# Patient Record
Sex: FEMALE | Race: WHITE | Employment: OTHER | URBAN - METROPOLITAN AREA
[De-identification: names, ages, dates, MRNs, and addresses within clinical notes are randomized per-mention and may not be internally consistent; named-entity substitution may affect disease eponyms.]

---

## 2017-04-07 ENCOUNTER — HOSPITAL ENCOUNTER (OUTPATIENT)
Dept: MAMMOGRAPHY | Age: 42
Discharge: HOME | End: 2017-04-07
Attending: PHYSICIAN ASSISTANT
Payer: COMMERCIAL

## 2017-04-07 DIAGNOSIS — Z12.39 SCREENING FOR BREAST CANCER: ICD-10-CM

## 2017-04-07 PROCEDURE — 77067 SCR MAMMO BI INCL CAD: CPT

## 2017-12-17 ENCOUNTER — HOSPITAL ENCOUNTER (EMERGENCY)
Age: 42
Discharge: HOME | End: 2017-12-17
Attending: EMERGENCY MEDICINE
Payer: SELF-PAY

## 2017-12-17 VITALS
RESPIRATION RATE: 16 BRPM | DIASTOLIC BLOOD PRESSURE: 62 MMHG | BODY MASS INDEX: 24.33 KG/M2 | TEMPERATURE: 97.6 F | OXYGEN SATURATION: 100 % | WEIGHT: 155 LBS | SYSTOLIC BLOOD PRESSURE: 100 MMHG | HEART RATE: 64 BPM | HEIGHT: 67 IN

## 2017-12-17 DIAGNOSIS — M54.10 RADICULOPATHY AFFECTING UPPER EXTREMITY: Primary | ICD-10-CM

## 2017-12-17 PROCEDURE — 74011250636 HC RX REV CODE- 250/636: Performed by: EMERGENCY MEDICINE

## 2017-12-17 PROCEDURE — 93005 ELECTROCARDIOGRAM TRACING: CPT | Performed by: STUDENT IN AN ORGANIZED HEALTH CARE EDUCATION/TRAINING PROGRAM

## 2017-12-17 PROCEDURE — 74011250637 HC RX REV CODE- 250/637: Performed by: EMERGENCY MEDICINE

## 2017-12-17 PROCEDURE — 96372 THER/PROPH/DIAG INJ SC/IM: CPT | Performed by: EMERGENCY MEDICINE

## 2017-12-17 PROCEDURE — 99284 EMERGENCY DEPT VISIT MOD MDM: CPT | Performed by: EMERGENCY MEDICINE

## 2017-12-17 RX ORDER — TRAMADOL HYDROCHLORIDE 50 MG/1
50 TABLET ORAL
Qty: 15 TAB | Refills: 0 | Status: SHIPPED | OUTPATIENT
Start: 2017-12-17 | End: 2017-12-20 | Stop reason: SDUPTHER

## 2017-12-17 RX ORDER — DEXAMETHASONE SODIUM PHOSPHATE 100 MG/10ML
10 INJECTION INTRAMUSCULAR; INTRAVENOUS
Status: COMPLETED | OUTPATIENT
Start: 2017-12-17 | End: 2017-12-17

## 2017-12-17 RX ORDER — PREDNISONE 10 MG/1
TABLET ORAL
Qty: 48 TAB | Refills: 0 | Status: SHIPPED | OUTPATIENT
Start: 2017-12-17 | End: 2018-01-18 | Stop reason: ALTCHOICE

## 2017-12-17 RX ORDER — HYDROMORPHONE HYDROCHLORIDE 1 MG/ML
0.75 INJECTION, SOLUTION INTRAMUSCULAR; INTRAVENOUS; SUBCUTANEOUS
Status: COMPLETED | OUTPATIENT
Start: 2017-12-17 | End: 2017-12-17

## 2017-12-17 RX ORDER — METHOCARBAMOL 750 MG/1
750 TABLET, FILM COATED ORAL
Status: COMPLETED | OUTPATIENT
Start: 2017-12-17 | End: 2017-12-17

## 2017-12-17 RX ORDER — METHOCARBAMOL 750 MG/1
750 TABLET, FILM COATED ORAL 3 TIMES DAILY
Qty: 18 TAB | Refills: 0 | Status: SHIPPED | OUTPATIENT
Start: 2017-12-17 | End: 2018-01-18 | Stop reason: ALTCHOICE

## 2017-12-17 RX ADMIN — HYDROMORPHONE HYDROCHLORIDE 0.75 MG: 1 INJECTION, SOLUTION INTRAMUSCULAR; INTRAVENOUS; SUBCUTANEOUS at 13:21

## 2017-12-17 RX ADMIN — METHOCARBAMOL 750 MG: 750 TABLET ORAL at 13:20

## 2017-12-17 RX ADMIN — DEXAMETHASONE SODIUM PHOSPHATE 10 MG: 10 INJECTION INTRAMUSCULAR; INTRAVENOUS at 13:17

## 2017-12-17 NOTE — ED NOTES
Pt. Resting in bed, breathing and vital signs within normal limits, offered water and blanket. Pt. States she is feeling better after taking the medication.

## 2017-12-17 NOTE — DISCHARGE INSTRUCTIONS
Pinched Nerve in the Neck: Care Instructions  Your Care Instructions  A pinched nerve in the neck happens when a vertebra or disc in the upper part of your spine is damaged. This damage can happen because of an injury. Or it can just happen with age. The changes caused by the damage may put pressure on a nearby nerve root, pinching it. This causes symptoms such as sharp pain in your neck, shoulder, arm, hand, or back. You may also have tingling or numbness. Sometimes it makes your arm weaker. The symptoms are usually worse when you turn your head or strain your neck. For many people, the symptoms get better over time and finally go away. Early treatment usually includes medicines for pain and swelling. Sometimes physical therapy and special exercises may help. Follow-up care is a key part of your treatment and safety. Be sure to make and go to all appointments, and call your doctor if you are having problems. It's also a good idea to know your test results and keep a list of the medicines you take. How can you care for yourself at home? · Be safe with medicines. Read and follow all instructions on the label. ¨ If the doctor gave you a prescription medicine for pain, take it as prescribed. ¨ If you are not taking a prescription pain medicine, ask your doctor if you can take an over-the-counter medicine. · Try using a heating pad on a low or medium setting for 15 to 20 minutes every 2 or 3 hours. Try a warm shower in place of one session with the heating pad. You can also buy single-use heat wraps that last up to 8 hours. · You can also try an ice pack for 10 to 15 minutes every 2 to 3 hours. There isn't strong evidence that either heat or ice will help. But you can try them to see if they help you. · Don't spend too long in one position. Take short breaks to move around and change positions. · Wear a seat belt and shoulder harness when you are in a car.   · Sleep with a pillow under your head and neck that keeps your neck straight. · If you were given a neck brace (cervical collar) to limit neck motion, wear it as instructed for as many days as your doctor tells you to. Do not wear it longer than you were told to. Wearing a brace for too long can lead to neck stiffness and can weaken the neck muscles. · Follow your doctor's instructions for gentle neck-stretching exercises. · Do not smoke. Smoking can slow healing of your discs. If you need help quitting, talk to your doctor about stop-smoking programs and medicines. These can increase your chances of quitting for good. · Avoid strenuous work or exercise until your doctor says it is okay. When should you call for help? Call 911 anytime you think you may need emergency care. For example, call if:  ? · You are unable to move an arm or a leg at all. ?Call your doctor now or seek immediate medical care if:  ? · You have new or worse symptoms in your arms, legs, chest, belly, or buttocks. Symptoms may include:  ¨ Numbness or tingling. ¨ Weakness. ¨ Pain. ? · You lose bladder or bowel control. ? Watch closely for changes in your health, and be sure to contact your doctor if:  ? · You are not getting better as expected. Where can you learn more? Go to http://lincoln-evelyne.info/. Enter K130 in the search box to learn more about \"Pinched Nerve in the Neck: Care Instructions. \"  Current as of: March 21, 2017  Content Version: 11.4  © 7344-7326 [employer]. Care instructions adapted under license by [employer] (which disclaims liability or warranty for this information). If you have questions about a medical condition or this instruction, always ask your healthcare professional. [place] [address] any warranty or liability for your use of this information.

## 2017-12-17 NOTE — ED NOTES
I have reviewed discharge instructions with the patient. The patient verbalized understanding. Patient left ED via Discharge Method: ambulatory to Home with (insert name of family/friend, self, transport via ). Opportunity for questions and clarification provided. Patient given 3 scripts. To continue your aftercare when you leave the hospital, you may receive an automated call from our care team to check in on how you are doing. This is a free service and part of our promise to provide the best care and service to meet your aftercare needs.  If you have questions, or wish to unsubscribe from this service please call [phone]. Thank you for Choosing our [facility] Emergency Department.

## 2017-12-17 NOTE — ED TRIAGE NOTES
Pt in c/o upper back pain radiating into left shoulder starting Thursday. States seen at chiropractor Friday and had xrays done. Denies n/v/d/sob/injury. States pain was present when she woke up Thursday.

## 2017-12-17 NOTE — ED PROVIDER NOTES
HPI Comments: Patient is a 56-year-old female who is coming in with pain to the left shoulder and upper back he states it started Thursday when she woke up. It is been mainly along the inside border of her scapula but now has been radiating down her left arm. She's had no weakness or any other associated symptoms she denies any vomiting diarrhea or trouble breathing or trauma at all. She does state that she had x-rays done from her chiropractor and has brought these in with her she has tried ibuprofen without relief. Patient is a 40 y.o. female presenting with back pain. The history is provided by the patient. Back Pain    Pertinent negatives include no chest pain, no fever and no abdominal pain. Past Medical History:   Diagnosis Date    Anemia in pregnancy 7/15/2014    Anemia in pregnancy 7/15/2014    Gestational diabetes 2014    Postpartum depression     no meds currently       Past Surgical History:   Procedure Laterality Date     DELIVERY ONLY      HX  SECTION      HX  SECTION  16    HX OTHER SURGICAL      HX WISDOM TEETH EXTRACTION           Family History:   Problem Relation Age of Onset    Diabetes Father 48    Hypertension Father     SLE Maternal Aunt     Heart Disease Maternal Uncle     Stroke Maternal Grandmother [de-identified]    Heart Disease Maternal Grandfather     Diabetes Paternal Uncle 48    Breast Cancer Neg Hx        Social History     Social History    Marital status:      Spouse name: Mina Whitaker Number of children: 3    Years of education: 12     Occupational History    Not on file.      Social History Main Topics    Smoking status: Never Smoker    Smokeless tobacco: Never Used    Alcohol use No    Drug use: No    Sexual activity: Yes     Partners: Male     Birth control/ protection: Surgical      Comment: Vasectomy     Other Topics Concern    Not on file     Social History Narrative         ALLERGIES: Review of patient's allergies indicates no known allergies. Review of Systems   Constitutional: Negative for chills and fever. Respiratory: Negative for chest tightness, shortness of breath, wheezing and stridor. Cardiovascular: Negative for chest pain and palpitations. Gastrointestinal: Negative for abdominal pain, diarrhea, nausea and vomiting. Musculoskeletal: Positive for back pain. Negative for neck pain and neck stiffness. All other systems reviewed and are negative. Vitals:    12/17/17 1224   BP: 116/68   Pulse: 78   Resp: 17   Temp: 97.7 °F (36.5 °C)   SpO2: 100%   Weight: 70.3 kg (155 lb)   Height: 5' 7\" (1.702 m)            Physical Exam   Constitutional: She is oriented to person, place, and time. She appears well-developed and well-nourished. No distress. HENT:   Head: Normocephalic and atraumatic. Eyes: Conjunctivae are normal. No scleral icterus. Neck: Normal range of motion. Neck supple. Cardiovascular: Normal rate, regular rhythm and normal heart sounds. Pulmonary/Chest: Effort normal and breath sounds normal. No stridor. No respiratory distress. She has no wheezes. She has no rales. She exhibits no tenderness. Abdominal: Soft. She exhibits no distension. There is no tenderness. There is no rebound and no guarding. Musculoskeletal:   Pain along the medial border of the scapula diffusely with palpation. Neurological: She is alert and oriented to person, place, and time. No focal weakness 5 over 5 strength no arm drift L arm weakness sensation intact and symmetric in both upper extremities. Skin: Skin is warm and dry. No rash noted. She is not diaphoretic. No erythema. Psychiatric: She has a normal mood and affect. Her behavior is normal.   Nursing note and vitals reviewed.        MDM  Number of Diagnoses or Management Options  Radiculopathy affecting upper extremity:   Diagnosis management comments: Patient feeling better on reevaluation she states pain is down to about a 3 chest no focal weakness. But was advised that if symptoms persisted she may need further workup including MRI to elicit the etiology. We will try treating symptomatically first.    Dulce Maria Bacon MD; 12/17/2017 @2:32 PM Voice dictation software was used during the making of this note. This software is not perfect and grammatical and other typographical errors may be present.   This note has not been proofread for errors.  ===================================================================        Amount and/or Complexity of Data Reviewed  Independent visualization of images, tracings, or specimens: yes      ED Course       Procedures

## 2017-12-18 LAB
ATRIAL RATE: 70 BPM
CALCULATED P AXIS, ECG09: 84 DEGREES
CALCULATED R AXIS, ECG10: 91 DEGREES
CALCULATED T AXIS, ECG11: 86 DEGREES
DIAGNOSIS, 93000: NORMAL
P-R INTERVAL, ECG05: 164 MS
Q-T INTERVAL, ECG07: 370 MS
QRS DURATION, ECG06: 76 MS
QTC CALCULATION (BEZET), ECG08: 399 MS
VENTRICULAR RATE, ECG03: 70 BPM

## 2019-08-02 ENCOUNTER — HOSPITAL ENCOUNTER (OUTPATIENT)
Dept: MAMMOGRAPHY | Age: 44
Discharge: HOME | End: 2019-08-02
Attending: OBSTETRICS & GYNECOLOGY
Payer: COMMERCIAL

## 2019-08-02 DIAGNOSIS — Z12.39 SCREENING FOR BREAST CANCER: ICD-10-CM

## 2019-08-02 PROCEDURE — 77067 SCR MAMMO BI INCL CAD: CPT

## 2022-10-18 ENCOUNTER — OFFICE VISIT (OUTPATIENT)
Dept: OBGYN CLINIC | Age: 47
End: 2022-10-18

## 2022-10-18 VITALS
WEIGHT: 180 LBS | BODY MASS INDEX: 28.93 KG/M2 | SYSTOLIC BLOOD PRESSURE: 110 MMHG | HEIGHT: 66 IN | DIASTOLIC BLOOD PRESSURE: 84 MMHG

## 2022-10-18 DIAGNOSIS — Z01.419 WELL WOMAN EXAM: Primary | ICD-10-CM

## 2022-10-18 DIAGNOSIS — Z12.4 SCREENING FOR CERVICAL CANCER: ICD-10-CM

## 2022-10-18 DIAGNOSIS — Z11.51 SCREENING FOR HUMAN PAPILLOMAVIRUS (HPV): ICD-10-CM

## 2022-10-18 DIAGNOSIS — Z12.31 ENCOUNTER FOR SCREENING MAMMOGRAM FOR MALIGNANT NEOPLASM OF BREAST: ICD-10-CM

## 2022-10-18 DIAGNOSIS — N92.0 MENORRHAGIA WITH REGULAR CYCLE: ICD-10-CM

## 2022-10-18 PROBLEM — M54.12 CERVICAL RADICULOPATHY: Status: ACTIVE | Noted: 2022-02-11

## 2022-10-18 PROCEDURE — 99396 PREV VISIT EST AGE 40-64: CPT | Performed by: OBSTETRICS & GYNECOLOGY

## 2022-10-18 RX ORDER — TRANEXAMIC ACID 650 1/1
1300 TABLET ORAL EVERY 8 HOURS PRN
Qty: 30 TABLET | Refills: 12 | Status: SHIPPED | OUTPATIENT
Start: 2022-10-18

## 2022-10-18 NOTE — PROGRESS NOTES
10/18/2022    Keri Body  1975      PCP: Betty Gutierrez MD  Patient does not see them for regular preventative visits. HPI: 56y.o. year old No obstetric history on file. Here for annual gyn wellness exam.   Last seen 9-2020. BC = vas. Having very heavy periods the 1st 1-3 days of each period. Having to double up large tampon and pad. Also a lot of lead up spotting for a few days. This has been an issue the past 2yrs  No pain with menses. Patient's last menstrual period was 09/22/2022. Social History     Socioeconomic History    Marital status:      Spouse name: None    Number of children: None    Years of education: 16    Highest education level: None   Tobacco Use    Smoking status: Never    Smokeless tobacco: Never   Substance and Sexual Activity    Alcohol use: No    Drug use: No    Sexual activity: Yes     Partners: Male     Birth control/protection: Surgical     Comment: huband - Vasectomy     Last pap - neg cotest 9-2020  Lipids- declines  Colonoscopy- has not had  Mammogram- NL 8-2019      Allergies, medications, past medical and surgical history, social history, family history all reviewed.        Review of Systems      Constitutional:  Denies unexplained weight loss/gain or heat/ cold intolerance/loss of balance  ENT: Denies blurred vision, loss of hearing, hoarseness  Cardiovascular:  Denies chest pain, swelling in legs or feet, shortness of breath when lying flat  Respiratory:  Denies shortness of breath, cough greater than 2 weeks or coughing up blood  Gastro: Denies diarrhea greater than 2 weeks, rectal bleeding, bloody stools, heartburn, or constipation  :  Denies blood in urine, nocturia, dysuria or incontinence  Breast:  Denies nipple discharge, masses or pain  Skin:  Denies rash greater than 2 weeks, change in moles  Musculoskeletal/Neuro:  Denies joint pain, muscle weakness, seizures, headaches  Psych:  Denies new onset depression, anxiety, panic attacks  Heme:  Denies easy bruising, bleeding gums, frequent nosebleeds or swollen lymph nodes  GYN:  Denies bleeding or spotting between menses,  pain with sex, severe menstrual cramps, bleeding after sex    Patient referred to a PCP for any significant nongyn findings on ROS. /84   Ht 5' 6\" (1.676 m)   Wt 180 lb (81.6 kg)   LMP 09/22/2022   BMI 29.05 kg/m²     Body mass index is 29.05 kg/m². Wt Readings from Last 3 Encounters:   10/18/22 180 lb (81.6 kg)         Pt in no distress. Alert and oriented x3. Affect bright. Well developed, well nourished. HEENT: normocephalic, atraumatic. Sclerae nonicteric. Neck supple w/o thyromegaly. Trachea midline. Lungs:  Respiratory effort normal.   Breasts: no masses or axillary lymphadenopathy  Abdomen: soft and nontender, no masses, no organomegaly, no ventral hernia  Pelvic: normal external genitalia, urethral meatus, vagina and cervix. Bimanual exam w/o obvious masses or tenderness. Bladder nontender. Uterus upper limits normal size. Adnexae normal.  Perineum and anus normal. No hemorrhoids. Josh Lee was seen today for annual exam.    Diagnoses and all orders for this visit:    Well woman exam    Encounter for screening mammogram for malignant neoplasm of breast  -     LENNOX DIGITAL SCREEN W OR WO CAD BILATERAL; Future    Screening for cervical cancer  -     PAP IG, Aptima HPV and rfx 16/18,45 ([id number])    Screening for human papillomavirus (HPV)  -     PAP IG, Aptima HPV and rfx 16/18,45 ([id number])    Menorrhagia with regular cycle  -     TSH with Reflex; Future  -     CBC; Future  -     tranexamic acid (LYSTEDA) 650 MG TABS tablet; Take 2 tablets by mouth every 8 hours as needed (heavy menses)          Pt counseling: Will get pt up to date on mammogram. Pap sent today. Pt is self pay. We discussed colonoscopy- she might want to do cologuard instead. But she may have a GF who had colon ca. She is going to ck with her mother.   Rtn for Stephanie Honeycutt maged. In the meantime, try lysteda. No known fam hx dvt/pe. Her father may have had some type of clot, but was related to an injury.      Tone Herrmann MD, MD

## 2022-10-19 LAB
ERYTHROCYTE [DISTWIDTH] IN BLOOD BY AUTOMATED COUNT: 17.3 % (ref 11.9–14.6)
HCT VFR BLD AUTO: 38.6 % (ref 35.8–46.3)
HGB BLD-MCNC: 11.7 G/DL (ref 11.7–15.4)
MCH RBC QN AUTO: 24.5 PG (ref 26.1–32.9)
MCHC RBC AUTO-ENTMCNC: 30.3 G/DL (ref 31.4–35)
MCV RBC AUTO: 80.9 FL (ref 82–102)
NRBC # BLD: 0 K/UL (ref 0–0.2)
PLATELET # BLD AUTO: 287 K/UL (ref 150–450)
PMV BLD AUTO: 9.9 FL (ref 9.4–12.3)
RBC # BLD AUTO: 4.77 M/UL (ref 4.05–5.2)
TSH W FREE THYROID IF ABNORMAL: 2.21 UIU/ML (ref 0.36–3.74)
WBC # BLD AUTO: 7.1 K/UL (ref 4.3–11.1)

## 2022-10-28 LAB
CYTOLOGIST CVX/VAG CYTO: NORMAL
CYTOLOGY CVX/VAG DOC THIN PREP: NORMAL
HPV APTIMA: NEGATIVE
Lab: NORMAL
Lab: NORMAL
PATH REPORT.FINAL DX SPEC: NORMAL
STAT OF ADQ CVX/VAG CYTO-IMP: NORMAL

## 2023-01-02 NOTE — PROGRESS NOTES
SONOHYSTEROGRAM    1/3/2023    Yanelis Granado  1975      56 y.o. Patient's last menstrual period was 12/05/2022 (approximate). Birth Control: vas                    UCG: neg    History: very heavy periods the 1st 3d of each cycle. I gave her rx miguel ROBBINS in Oct.     See ultrasound report for ultrasound results. Uterine vol 109  Stripe 18mm with mass effect  1cm fibroid measured  Both ovaries NL    /70   Wt 182 lb (82.6 kg)   LMP 12/05/2022 (Approximate)   BMI 29.38 kg/m²       Patient was placed in the lithotomy position and speculum was inserted. Cervix visualized and cleansed with betadine. Single-tooth tenaculum was not applied to the anterior cervix. Catheter was inserted through the OS. Sounding depth was 8 CM. Speculum and/or tenaculum were removed. Sterile saline 8 cc was instilled and ultrasound revealed large lesion c/w polyp. Endometrial biopsy was not obtained. Patient tolerated procedure well. Encounter Diagnosis   Name Primary? Menorrhagia with regular cycle Yes       Orders Placed This Encounter   Procedures    AMB POC URINE PREGNANCY TEST, VISUAL COLOR COMPARISON         Follow-Up Plan:   D/w pt that removal of the polyp should help her menstrual flow. Also needs to be removed d/t occ finding of endometrial pathology within a polyp. Staff mess sent to [facility] to schedule.        Sean Peraza MD, MD

## 2023-01-03 ENCOUNTER — PROCEDURE VISIT (OUTPATIENT)
Dept: OBGYN CLINIC | Age: 48
End: 2023-01-03

## 2023-01-03 VITALS — BODY MASS INDEX: 29.38 KG/M2 | WEIGHT: 182 LBS | DIASTOLIC BLOOD PRESSURE: 70 MMHG | SYSTOLIC BLOOD PRESSURE: 102 MMHG

## 2023-01-03 DIAGNOSIS — N92.0 MENORRHAGIA WITH REGULAR CYCLE: Primary | ICD-10-CM

## 2023-01-03 LAB
HCG, PREGNANCY, URINE, POC: NEGATIVE
VALID INTERNAL CONTROL, POC: YES

## 2023-01-03 PROCEDURE — 76831 ECHO EXAM UTERUS: CPT | Performed by: OBSTETRICS & GYNECOLOGY

## 2023-01-03 PROCEDURE — 58340 CATHETER FOR HYSTEROGRAPHY: CPT | Performed by: OBSTETRICS & GYNECOLOGY

## 2023-01-03 PROCEDURE — 81025 URINE PREGNANCY TEST: CPT | Performed by: OBSTETRICS & GYNECOLOGY

## 2023-01-04 ENCOUNTER — PREP FOR PROCEDURE (OUTPATIENT)
Dept: OBGYN CLINIC | Age: 48
End: 2023-01-04

## 2023-01-04 PROBLEM — N92.0 HEAVY MENSTRUAL PERIOD: Status: ACTIVE | Noted: 2023-01-04

## 2023-02-16 PROBLEM — R93.5 ABNORMAL ULTRASOUND OF ENDOMETRIUM: Status: ACTIVE | Noted: 2023-02-16

## 2023-02-16 RX ORDER — FERROUS SULFATE 325(65) MG
325 TABLET ORAL
COMMUNITY

## 2023-02-16 NOTE — PERIOP NOTE
Patient verified name and . Order for consent not found in EHR. Type 1B surgery, PAT phone assessment complete. Orders not received. Labs per surgeon: no orders received. Labs per anesthesia protocol: Hgb- Pt instructed to come for lab work (Monday- Friday 8:00 AM- 3:30 PM) prior to surgery day. Pt voiced an understanding. Patient answered medical/surgical history questions at their best of ability. All prior to admission medications documented in [facility]. Patient instructed to take the following medications the day of surgery according to anesthesia guidelines with a small sip of water: none. On the day before surgery please take Acetaminophen 1000mg in the morning and then again before bed. You may substitute for Tylenol 650 mg. Hold all vitamins/supplements 7 days prior to surgery and NSAIDS 5 days prior to surgery. Patient instructed on the following:    > Arrive at A Entrance, time of arrival to be called the day before by 1700  > NPO after midnight, unless otherwise indicated, including gum, mints, and ice chips  > Responsible adult must drive patient to the hospital, stay during surgery, and patient will need supervision 24 hours after anesthesia  > Use antibacterial soap in shower the night before surgery and on the morning of surgery  > All piercings must be removed prior to arrival.    > Leave all valuables (money and jewelry) at home but bring insurance card and ID on DOS.   > You may be required to pay a deductible or co-pay on the day of your procedure. You can pre-pay by calling [phone] if your surgery is at the [facility] or [phone] if your surgery is at the [facility]. > Do not wear make-up, nail polish, lotions, cologne, perfumes, powders, or oil on skin. Artificial nails are not permitted.

## 2023-02-16 NOTE — PROGRESS NOTES
2023      Dony Lorenzana  : 1975  56 y.o.       [id number]     Patient's last menstrual period was 2023. Patient is scheduled for d&c, hysteroscopy, myosure for very heavy menses. Please see pertinent office notes. Ultrasound shows large polyp on sonohys. Last pap neg cotest   Contraception: vasectomy    ROS: pertinent items noted in HPI    No Known Allergies  Current Outpatient Medications   Medication Sig Dispense Refill    Multiple Vitamins-Minerals (HAIR SKIN AND NAILS FORMULA) TABS Take by mouth daily      ferrous sulfate (IRON 325) 325 (65 Fe) MG tablet Take 325 mg by mouth daily (with breakfast)       No current facility-administered medications for this visit. Past Medical History:   Diagnosis Date    Anemia in pregnancy 7/15/2014    GERD (gastroesophageal reflux disease)     HX of    Gestational diabetes 2014    Iron deficiency anemia     Postpartum depression     no meds currently     Past Surgical History:   Procedure Laterality Date     SECTION  16     SECTION      WISDOM TOOTH EXTRACTION       Social History     Tobacco Use    Smoking status: Never    Smokeless tobacco: Never   Vaping Use    Vaping Use: Never used   Substance Use Topics    Alcohol use: No    Drug use: No         Exam:    /70   Ht 5' 6\" (1.676 m)   Wt 181 lb (82.1 kg)   LMP 2023   BMI 29.21 kg/m²   Body mass index is 29.21 kg/m². Patient alert and oriented x 3. In no distress. Heart RRR, no murmur or gallop  Lungs clear to auscultation  Pelvic: NL bimanual and speculum exams    Proceed with the above  for   Encounter Diagnoses   Name Primary? Menorrhagia with regular cycle Yes    Abnormal ultrasound of endometrium        Reviewed the risks of surgery including anesthesia, bleeding, transfusion, infection, injury to bowel/bladder/uterus. This procedure most likely will help her heavy periods, but that cannot be guaranteed.   Plan otc motrin and tylenol postop  Discussed activity restrictions  No hx postop n/v

## 2023-02-16 NOTE — H&P (VIEW-ONLY)
2023      Lissette Sterling  : 1975  56 y.o.       [id number]     Patient's last menstrual period was 2023. Patient is scheduled for d&c, hysteroscopy, myosure for very heavy menses. Please see pertinent office notes. Ultrasound shows large polyp on sonohys. Last pap neg cotest   Contraception: vasectomy    ROS: pertinent items noted in HPI    No Known Allergies  Current Outpatient Medications   Medication Sig Dispense Refill    Multiple Vitamins-Minerals (HAIR SKIN AND NAILS FORMULA) TABS Take by mouth daily      ferrous sulfate (IRON 325) 325 (65 Fe) MG tablet Take 325 mg by mouth daily (with breakfast)       No current facility-administered medications for this visit. Past Medical History:   Diagnosis Date    Anemia in pregnancy 7/15/2014    GERD (gastroesophageal reflux disease)     HX of    Gestational diabetes 2014    Iron deficiency anemia     Postpartum depression     no meds currently     Past Surgical History:   Procedure Laterality Date     SECTION  16     SECTION      WISDOM TOOTH EXTRACTION       Social History     Tobacco Use    Smoking status: Never    Smokeless tobacco: Never   Vaping Use    Vaping Use: Never used   Substance Use Topics    Alcohol use: No    Drug use: No         Exam:    /70   Ht 5' 6\" (1.676 m)   Wt 181 lb (82.1 kg)   LMP 2023   BMI 29.21 kg/m²   Body mass index is 29.21 kg/m². Patient alert and oriented x 3. In no distress. Heart RRR, no murmur or gallop  Lungs clear to auscultation  Pelvic: NL bimanual and speculum exams    Proceed with the above  for   Encounter Diagnoses   Name Primary? Menorrhagia with regular cycle Yes    Abnormal ultrasound of endometrium        Reviewed the risks of surgery including anesthesia, bleeding, transfusion, infection, injury to bowel/bladder/uterus. This procedure most likely will help her heavy periods, but that cannot be guaranteed.   Plan otc motrin and tylenol postop  Discussed activity restrictions  No hx postop n/v

## 2023-02-17 ENCOUNTER — HOSPITAL ENCOUNTER (OUTPATIENT)
Dept: LAB | Age: 48
Discharge: HOME | End: 2023-02-17

## 2023-02-17 ENCOUNTER — OFFICE VISIT (OUTPATIENT)
Dept: OBGYN CLINIC | Age: 48
End: 2023-02-17

## 2023-02-17 VITALS
BODY MASS INDEX: 29.09 KG/M2 | SYSTOLIC BLOOD PRESSURE: 102 MMHG | DIASTOLIC BLOOD PRESSURE: 70 MMHG | WEIGHT: 181 LBS | HEIGHT: 66 IN

## 2023-02-17 DIAGNOSIS — Z01.818 PRE-OP TESTING: ICD-10-CM

## 2023-02-17 DIAGNOSIS — N92.0 MENORRHAGIA WITH REGULAR CYCLE: Primary | ICD-10-CM

## 2023-02-17 DIAGNOSIS — R93.5 ABNORMAL ULTRASOUND OF ENDOMETRIUM: ICD-10-CM

## 2023-02-17 LAB — HGB BLD-MCNC: 12.8 G/DL (ref 11.7–15.4)

## 2023-02-17 PROCEDURE — 36415 COLL VENOUS BLD VENIPUNCTURE: CPT

## 2023-02-17 PROCEDURE — 85018 HEMOGLOBIN: CPT

## 2023-02-19 RX ORDER — SODIUM CHLORIDE 9 MG/ML
INJECTION, SOLUTION INTRAVENOUS PRN
Status: CANCELLED | OUTPATIENT
Start: 2023-02-19

## 2023-02-19 RX ORDER — SODIUM CHLORIDE 0.9 % (FLUSH) 0.9 %
5-40 SYRINGE (ML) INJECTION EVERY 12 HOURS SCHEDULED
Status: CANCELLED | OUTPATIENT
Start: 2023-02-19

## 2023-02-19 RX ORDER — SODIUM CHLORIDE 0.9 % (FLUSH) 0.9 %
5-40 SYRINGE (ML) INJECTION PRN
Status: CANCELLED | OUTPATIENT
Start: 2023-02-19

## 2023-02-23 ENCOUNTER — ANESTHESIA EVENT (OUTPATIENT)
Dept: SURGERY | Age: 48
End: 2023-02-23

## 2023-02-24 ENCOUNTER — HOSPITAL ENCOUNTER (OUTPATIENT)
Age: 48
Discharge: HOME | End: 2023-02-24
Attending: OBSTETRICS & GYNECOLOGY | Admitting: OBSTETRICS & GYNECOLOGY

## 2023-02-24 ENCOUNTER — ANESTHESIA (OUTPATIENT)
Dept: SURGERY | Age: 48
End: 2023-02-24

## 2023-02-24 VITALS
DIASTOLIC BLOOD PRESSURE: 58 MMHG | TEMPERATURE: 97.5 F | BODY MASS INDEX: 27.97 KG/M2 | WEIGHT: 174 LBS | OXYGEN SATURATION: 100 % | HEIGHT: 66 IN | HEART RATE: 58 BPM | RESPIRATION RATE: 16 BRPM | SYSTOLIC BLOOD PRESSURE: 95 MMHG

## 2023-02-24 DIAGNOSIS — Z01.818 PRE-OP TESTING: Primary | ICD-10-CM

## 2023-02-24 PROBLEM — N92.0 MENORRHAGIA WITH REGULAR CYCLE: Status: ACTIVE | Noted: 2023-02-24

## 2023-02-24 PROBLEM — N84.0 ENDOMETRIAL POLYP: Status: ACTIVE | Noted: 2023-02-24

## 2023-02-24 LAB — HCG UR QL: NEGATIVE

## 2023-02-24 PROCEDURE — 3700000001 HC ADD 15 MINUTES (ANESTHESIA): Performed by: OBSTETRICS & GYNECOLOGY

## 2023-02-24 PROCEDURE — 6370000000 HC RX 637 (ALT 250 FOR IP): Performed by: ANESTHESIOLOGY

## 2023-02-24 PROCEDURE — 7100000000 HC PACU RECOVERY - FIRST 15 MIN: Performed by: OBSTETRICS & GYNECOLOGY

## 2023-02-24 PROCEDURE — 2720000010 HC SURG SUPPLY STERILE: Performed by: OBSTETRICS & GYNECOLOGY

## 2023-02-24 PROCEDURE — 2580000003 HC RX 258: Performed by: ANESTHESIOLOGY

## 2023-02-24 PROCEDURE — 3700000000 HC ANESTHESIA ATTENDED CARE: Performed by: OBSTETRICS & GYNECOLOGY

## 2023-02-24 PROCEDURE — 7100000001 HC PACU RECOVERY - ADDTL 15 MIN: Performed by: OBSTETRICS & GYNECOLOGY

## 2023-02-24 PROCEDURE — 81025 URINE PREGNANCY TEST: CPT

## 2023-02-24 PROCEDURE — 2709999900 HC NON-CHARGEABLE SUPPLY: Performed by: OBSTETRICS & GYNECOLOGY

## 2023-02-24 PROCEDURE — 88305 TISSUE EXAM BY PATHOLOGIST: CPT

## 2023-02-24 PROCEDURE — 2500000003 HC RX 250 WO HCPCS: Performed by: NURSE ANESTHETIST, CERTIFIED REGISTERED

## 2023-02-24 PROCEDURE — 6360000002 HC RX W HCPCS: Performed by: NURSE ANESTHETIST, CERTIFIED REGISTERED

## 2023-02-24 PROCEDURE — 7100000010 HC PHASE II RECOVERY - FIRST 15 MIN: Performed by: OBSTETRICS & GYNECOLOGY

## 2023-02-24 PROCEDURE — 3600000003 HC SURGERY LEVEL 3 BASE: Performed by: OBSTETRICS & GYNECOLOGY

## 2023-02-24 PROCEDURE — 58558 HYSTEROSCOPY BIOPSY: CPT | Performed by: OBSTETRICS & GYNECOLOGY

## 2023-02-24 PROCEDURE — 6360000002 HC RX W HCPCS: Performed by: ANESTHESIOLOGY

## 2023-02-24 PROCEDURE — 3600000013 HC SURGERY LEVEL 3 ADDTL 15MIN: Performed by: OBSTETRICS & GYNECOLOGY

## 2023-02-24 RX ORDER — ACETAMINOPHEN 500 MG
1000 TABLET ORAL ONCE
Status: COMPLETED | OUTPATIENT
Start: 2023-02-24 | End: 2023-02-24

## 2023-02-24 RX ORDER — SODIUM CHLORIDE, SODIUM LACTATE, POTASSIUM CHLORIDE, CALCIUM CHLORIDE 600; 310; 30; 20 MG/100ML; MG/100ML; MG/100ML; MG/100ML
INJECTION, SOLUTION INTRAVENOUS CONTINUOUS
Status: DISCONTINUED | OUTPATIENT
Start: 2023-02-24 | End: 2023-02-24 | Stop reason: HOSPADM

## 2023-02-24 RX ORDER — HALOPERIDOL 5 MG/ML
1 INJECTION INTRAMUSCULAR
Status: DISCONTINUED | OUTPATIENT
Start: 2023-02-24 | End: 2023-02-24 | Stop reason: HOSPADM

## 2023-02-24 RX ORDER — LIDOCAINE HYDROCHLORIDE 20 MG/ML
INJECTION, SOLUTION EPIDURAL; INFILTRATION; INTRACAUDAL; PERINEURAL PRN
Status: DISCONTINUED | OUTPATIENT
Start: 2023-02-24 | End: 2023-02-24 | Stop reason: SDUPTHER

## 2023-02-24 RX ORDER — EPHEDRINE SULFATE/0.9% NACL/PF 50 MG/5 ML
SYRINGE (ML) INTRAVENOUS PRN
Status: DISCONTINUED | OUTPATIENT
Start: 2023-02-24 | End: 2023-02-24 | Stop reason: SDUPTHER

## 2023-02-24 RX ORDER — SODIUM CHLORIDE 0.9 % (FLUSH) 0.9 %
5-40 SYRINGE (ML) INJECTION PRN
Status: DISCONTINUED | OUTPATIENT
Start: 2023-02-24 | End: 2023-02-24 | Stop reason: HOSPADM

## 2023-02-24 RX ORDER — SODIUM CHLORIDE 0.9 % (FLUSH) 0.9 %
5-40 SYRINGE (ML) INJECTION EVERY 12 HOURS SCHEDULED
Status: DISCONTINUED | OUTPATIENT
Start: 2023-02-24 | End: 2023-02-24 | Stop reason: HOSPADM

## 2023-02-24 RX ORDER — DIPHENHYDRAMINE HYDROCHLORIDE 50 MG/ML
12.5 INJECTION INTRAMUSCULAR; INTRAVENOUS
Status: DISCONTINUED | OUTPATIENT
Start: 2023-02-24 | End: 2023-02-24 | Stop reason: HOSPADM

## 2023-02-24 RX ORDER — PROCHLORPERAZINE EDISYLATE 5 MG/ML
5 INJECTION INTRAMUSCULAR; INTRAVENOUS
Status: DISCONTINUED | OUTPATIENT
Start: 2023-02-24 | End: 2023-02-24 | Stop reason: HOSPADM

## 2023-02-24 RX ORDER — APREPITANT 40 MG/1
40 CAPSULE ORAL ONCE
Status: COMPLETED | OUTPATIENT
Start: 2023-02-24 | End: 2023-02-24

## 2023-02-24 RX ORDER — SODIUM CHLORIDE 9 MG/ML
INJECTION, SOLUTION INTRAVENOUS PRN
Status: DISCONTINUED | OUTPATIENT
Start: 2023-02-24 | End: 2023-02-24 | Stop reason: HOSPADM

## 2023-02-24 RX ORDER — OXYCODONE HYDROCHLORIDE 5 MG/1
5 TABLET ORAL
Status: COMPLETED | OUTPATIENT
Start: 2023-02-24 | End: 2023-02-24

## 2023-02-24 RX ORDER — PROMETHAZINE HYDROCHLORIDE 12.5 MG/1
12.5 TABLET ORAL EVERY 6 HOURS PRN
Qty: 20 TABLET | Refills: 0 | Status: SHIPPED | OUTPATIENT
Start: 2023-02-24 | End: 2023-03-03

## 2023-02-24 RX ORDER — ONDANSETRON 2 MG/ML
INJECTION INTRAMUSCULAR; INTRAVENOUS PRN
Status: DISCONTINUED | OUTPATIENT
Start: 2023-02-24 | End: 2023-02-24 | Stop reason: SDUPTHER

## 2023-02-24 RX ORDER — MIDAZOLAM HYDROCHLORIDE 1 MG/ML
2 INJECTION INTRAMUSCULAR; INTRAVENOUS
Status: COMPLETED | OUTPATIENT
Start: 2023-02-24 | End: 2023-02-24

## 2023-02-24 RX ORDER — PROPOFOL 10 MG/ML
INJECTION, EMULSION INTRAVENOUS PRN
Status: DISCONTINUED | OUTPATIENT
Start: 2023-02-24 | End: 2023-02-24 | Stop reason: SDUPTHER

## 2023-02-24 RX ORDER — DEXAMETHASONE SODIUM PHOSPHATE 10 MG/ML
INJECTION INTRAMUSCULAR; INTRAVENOUS PRN
Status: DISCONTINUED | OUTPATIENT
Start: 2023-02-24 | End: 2023-02-24 | Stop reason: SDUPTHER

## 2023-02-24 RX ORDER — DEXTROSE MONOHYDRATE 100 MG/ML
INJECTION, SOLUTION INTRAVENOUS CONTINUOUS PRN
Status: DISCONTINUED | OUTPATIENT
Start: 2023-02-24 | End: 2023-02-24 | Stop reason: HOSPADM

## 2023-02-24 RX ORDER — KETOROLAC TROMETHAMINE 30 MG/ML
INJECTION, SOLUTION INTRAMUSCULAR; INTRAVENOUS PRN
Status: DISCONTINUED | OUTPATIENT
Start: 2023-02-24 | End: 2023-02-24 | Stop reason: SDUPTHER

## 2023-02-24 RX ORDER — LIDOCAINE HYDROCHLORIDE 10 MG/ML
1 INJECTION, SOLUTION INFILTRATION; PERINEURAL
Status: DISCONTINUED | OUTPATIENT
Start: 2023-02-24 | End: 2023-02-24 | Stop reason: HOSPADM

## 2023-02-24 RX ADMIN — KETOROLAC TROMETHAMINE 30 MG: 30 INJECTION, SOLUTION INTRAMUSCULAR; INTRAVENOUS at 11:46

## 2023-02-24 RX ADMIN — ONDANSETRON 4 MG: 2 INJECTION INTRAMUSCULAR; INTRAVENOUS at 11:22

## 2023-02-24 RX ADMIN — APREPITANT 40 MG: 40 CAPSULE ORAL at 09:39

## 2023-02-24 RX ADMIN — Medication 10 MG: at 11:38

## 2023-02-24 RX ADMIN — LIDOCAINE HYDROCHLORIDE 80 MG: 20 INJECTION, SOLUTION EPIDURAL; INFILTRATION; INTRACAUDAL; PERINEURAL at 11:14

## 2023-02-24 RX ADMIN — SODIUM CHLORIDE, SODIUM LACTATE, POTASSIUM CHLORIDE, AND CALCIUM CHLORIDE: 600; 310; 30; 20 INJECTION, SOLUTION INTRAVENOUS at 11:42

## 2023-02-24 RX ADMIN — DEXAMETHASONE SODIUM PHOSPHATE 5 MG: 10 INJECTION INTRAMUSCULAR; INTRAVENOUS at 11:22

## 2023-02-24 RX ADMIN — ACETAMINOPHEN 1000 MG: 500 TABLET, FILM COATED ORAL at 09:39

## 2023-02-24 RX ADMIN — SODIUM CHLORIDE, SODIUM LACTATE, POTASSIUM CHLORIDE, AND CALCIUM CHLORIDE: 600; 310; 30; 20 INJECTION, SOLUTION INTRAVENOUS at 09:44

## 2023-02-24 RX ADMIN — PROPOFOL 250 MG: 10 INJECTION, EMULSION INTRAVENOUS at 11:14

## 2023-02-24 RX ADMIN — OXYCODONE HYDROCHLORIDE 5 MG: 5 TABLET ORAL at 12:24

## 2023-02-24 RX ADMIN — MIDAZOLAM 2 MG: 1 INJECTION INTRAMUSCULAR; INTRAVENOUS at 11:00

## 2023-02-24 ASSESSMENT — PAIN - FUNCTIONAL ASSESSMENT: PAIN_FUNCTIONAL_ASSESSMENT: 0-10

## 2023-02-24 ASSESSMENT — PAIN DESCRIPTION - LOCATION: LOCATION: ABDOMEN

## 2023-02-24 ASSESSMENT — PAIN DESCRIPTION - PAIN TYPE: TYPE: SURGICAL PAIN

## 2023-02-24 ASSESSMENT — PAIN SCALES - GENERAL: PAINLEVEL_OUTOF10: 4

## 2023-02-24 ASSESSMENT — PAIN DESCRIPTION - DESCRIPTORS: DESCRIPTORS: CRAMPING

## 2023-02-24 NOTE — ANESTHESIA POSTPROCEDURE EVALUATION
Department of Anesthesiology  Postprocedure Note    Patient: Aurora Olivares  MRN: [id number]  YOB: 1975  Date of evaluation: 2/24/2023      Procedure Summary     Date: 02/24/23 Room / Location: [facility] MAIN OR  / [facility] MAIN OR    Anesthesia Start: 1107 Anesthesia Stop: 1201    Procedure: DILATATION AND CURETTAGE HYSTEROSCOPY with Myosure (Vagina ) Diagnosis:       Heavy menstrual period      (Heavy menstrual period [N92.0])    Surgeons: Lela Shaw MD Responsible Provider: Isac Huitron MD    Anesthesia Type: General ASA Status: 2          Anesthesia Type: General    [place] Phase I: Georgia Score: 10    Georgia Phase II: Georgia Score: 10      Anesthesia Post Evaluation    Patient location during evaluation: PACU  Patient participation: complete - patient participated  Level of consciousness: awake and alert  Airway patency: patent  Nausea: well controlled. Complications: no  Cardiovascular status: acceptable.   Respiratory status: acceptable  Hydration status: stable

## 2023-02-24 NOTE — ANESTHESIA PRE PROCEDURE
Department of Anesthesiology  Preprocedure Note       Name:  Keri Body   Age:  56 y.o.  :  1975                                          MRN:  [id number]         Date:  2023      Surgeon: Arely Murillo):  Rhona Del Toro MD    Procedure: Procedure(s):  DILATATION AND CURETTAGE HYSTEROSCOPY with Myosure    Medications prior to admission:   Prior to Admission medications    Medication Sig Start Date End Date Taking?  Authorizing Provider   Multiple Vitamins-Minerals (HAIR SKIN AND NAILS FORMULA) TABS Take by mouth daily   Yes Historical Provider, MD   ferrous sulfate (IRON 325) 325 (65 Fe) MG tablet Take 325 mg by mouth daily (with breakfast)   Yes Historical Provider, MD       Current medications:    Current Facility-Administered Medications   Medication Dose Route Frequency Provider Last Rate Last Admin    lidocaine 1 % injection 1 mL  1 mL IntraDERmal Once PRN Lili Paul MD        lactated ringers IV soln infusion   IntraVENous Continuous Lili Paul  mL/hr at 23 0944 New Bag at 23 0944    sodium chloride flush 0.9 % injection 5-40 mL  5-40 mL IntraVENous 2 times per day Lili Paul MD        sodium chloride flush 0.9 % injection 5-40 mL  5-40 mL IntraVENous PRN Lili Paul MD        0.9 % sodium chloride infusion   IntraVENous PRN Lili Paul MD        midazolam (VERSED) injection 2 mg  2 mg IntraVENous Once PRN Lili Paul MD           Allergies:  No Known Allergies    Problem List:    Patient Active Problem List   Diagnosis Code    Hx gestational diabetes Z80.34    Family history of diabetes mellitus (DM) Z83.3    Cervical radiculopathy M54.12    Heavy menstrual period N92.0    Abnormal ultrasound of endometrium R93.5    Menorrhagia with regular cycle N92.0       Past Medical History:        Diagnosis Date    Anemia in pregnancy 7/15/2014    GERD (gastroesophageal reflux disease)     HX of    Gestational diabetes 2014    Iron deficiency anemia     Postpartum depression     no meds currently       Past Surgical History:        Procedure Laterality Date     SECTION  16     SECTION      WISDOM TOOTH EXTRACTION         Social History:    Social History     Tobacco Use    Smoking status: Never    Smokeless tobacco: Never   Substance Use Topics    Alcohol use: No                                Counseling given: Not Answered      Vital Signs (Current):   Vitals:    23 1010 23 0910   BP:  107/73   Pulse:  59   Resp:  16   Temp:  97.2 °F (36.2 °C)   TempSrc:  Temporal   SpO2:  100%   Weight: 175 lb (79.4 kg) 174 lb (78.9 kg)   Height: 5' 6\" (1.676 m)                                               BP Readings from Last 3 Encounters:   23 107/73   23 102/70   23 102/70       NPO Status: Time of last liquid consumption:                         Time of last solid consumption:                         Date of last liquid consumption: 23                        Date of last solid food consumption: 23    BMI:   Wt Readings from Last 3 Encounters:   23 174 lb (78.9 kg)   23 181 lb (82.1 kg)   23 182 lb (82.6 kg)     Body mass index is 28.08 kg/m². CBC:   Lab Results   Component Value Date/Time    WBC 7.1 10/18/2022 04:02 PM    RBC 4.77 10/18/2022 04:02 PM    HGB 12.8 2023 11:30 AM    HCT 38.6 10/18/2022 04:02 PM    MCV 80.9 10/18/2022 04:02 PM    RDW 17.3 10/18/2022 04:02 PM     10/18/2022 04:02 PM       CMP:   Lab Results   Component Value Date/Time    GLUCOSE 85 2019 08:36 AM       POC Tests: No results for input(s): POCGLU, POCNA, POCK, POCCL, POCBUN, POCHEMO, POCHCT in the last 72 hours. Coags: No results found for: PROTIME, INR, APTT    HCG (If Applicable): No results found for: PREGTESTUR, PREGSERUM, HCG, HCGQUANT     ABGs: No results found for: PHART, PO2ART, WNJ8ASU, ZFB1SXR, BEART, Z5FOUMYT     Type & Screen (If Applicable):   No results found for: Mac Days    Drug/Infectious Status (If Applicable):  No results found for: HIV, HEPCAB    COVID-19 Screening (If Applicable):   Lab Results   Component Value Date/Time    COVID19 Detected 07/07/2020 11:17 AM           Anesthesia Evaluation  Patient summary reviewed and Nursing notes reviewed no history of anesthetic complications:   Airway: Mallampati: II  TM distance: >3 FB   Neck ROM: full  Mouth opening: > = 3 FB   Dental: normal exam         Pulmonary:Negative Pulmonary ROS and normal exam                               Cardiovascular:Negative CV ROS                      Neuro/Psych:                ROS comment: Bulging cervical disc, not currently causing her problems GI/Hepatic/Renal:             Endo/Other:    (+) blood dyscrasia: anemia:., .                 Abdominal:             Vascular: Other Findings:           Anesthesia Plan      general     ASA 2     (LMA)  Induction: intravenous. MIPS: Postoperative opioids intended and Prophylactic antiemetics administered. Anesthetic plan and risks discussed with patient and spouse.                         Levon Elizabeth MD   2/24/2023

## 2023-02-24 NOTE — DISCHARGE INSTRUCTIONS
INSTRUCTIONS FOLLOWING HYSTEROSCOPY    ACTIVITY   Limited activity today; increase as tolerated tomorrow, but no vigorous exercise   Shower only; no tub baths   No douches, tampons or intercourse until your doctor releases you (at least 2 weeks)   May return to work or school as directed by your doctor      You will probably have bloody discharge (like a period) for 1-2 days, then watery discharge for another 7 days. You will want to use a perineal pad, not tampons for this. DIET   Clear liquids until no nausea or vomiting   Advance to regular diet as tolerated       PAIN   Expect a moderate amount of pain. Take pain medication as directed by your doctor. If no prescription for pain is sent         home with you, take the appropriate dose of your commonly used pain medication. Call your doctor if pain is NOT relieved by medication. DO NOT take aspirin or blood thinners until directed by your doctor. FOLLOW-UP PHONE CALLS     If you have any problems, call your doctor as needed. CALL YOUR DOCTOR IF   Excessive bleeding that soaks a pad in an hour   Temperature of 101°F or above   Green or yellow, smelly discharge   Unable to urinate by bedtime   Nausea and vomiting that does not stop by bedtime    MEDICATION INTERACTION:    During your procedure you potentially received a medication or medications which may reduce the effectiveness of oral contraceptives. Please consider other forms of contraception for 1 month following your procedure if you are currently using oral contraceptives as your primary form of birth control. In addition to this, we recommend continuing your oral contraceptive as prescribed, unless otherwise instructed by your physician, during this time.     After general anesthesia or intravenous sedation, for 24 hours or while taking prescription Narcotics:  Limit your activities  A responsible adult needs to be with you for the next 24 hours  Do not drive and operate hazardous machinery  Do not make important personal or business decisions  Do not drink alcoholic beverages  If you have not urinated within 8 hours after discharge, and you are experiencing discomfort from urinary retention, please go to the nearest ED. If you have sleep apnea and have a CPAP machine, please use it for all naps and sleeping. Please use caution when taking narcotics and any of your home medications that may cause drowsiness. *  Please give a list of your current medications to your Primary Care Provider. *  Please update this list whenever your medications are discontinued, doses are      changed, or new medications (including over-the-counter products) are added. *  Please carry medication information at all times in case of emergency situations. These are general instructions for a healthy lifestyle:  No smoking/ No tobacco products/ Avoid exposure to second hand smoke  Surgeon General's Warning:  Quitting smoking now greatly reduces serious risk to your health. Obesity, smoking, and sedentary lifestyle greatly increases your risk for illness  A healthy diet, regular physical exercise & weight monitoring are important for maintaining a healthy lifestyle    You may be retaining fluid if you have a history of heart failure or if you experience any of the following symptoms:  Weight gain of 3 pounds or more overnight or 5 pounds in a week, increased swelling in our hands or feet or shortness of breath while lying flat in bed. Please call your doctor as soon as you notice any of these symptoms; do not wait until your next office visit.

## 2023-02-24 NOTE — OP NOTE
HYSTEROSCOPY DILATATION & CURETTAGE , myosure FULL OP NOTE      PATIENT:  Jordyn Nieto    MRN:  [id number]    DATE OF PROCEDURE:  2/24/2023    PREOPERATIVE DIAGNOSIS:  Heavy menstrual period [N92.0]    POSTOPERATIVE DIAGNOSIS: endometrial polyp     PROCEDURE: Hysteroscopy, dilatation & curettage. SURGEON:  Anel Roldan MD      ANESTHESIA: GETA    SPECIMENS: ECC, EMC and polyp    EBL: 1 cc    FINDINGS: normal bimanual exam with Nl size uterus and no adnexal masses. PROCEDURE IN DETAIL: The patient was placed on the Operating Room table in the supine position. Time out was done to confirm the operating procedure, surgeon, patient and site. Once confirmed by the team, procedure was started. The patient underwent general endotracheal anesthesia and was repositioned in the dorsal lithotomy position, prepped and draped in the usual sterile fashion for vaginal surgery. The cervix was exposed with a weighted vaginal speculum. A single tooth tenaculum was placed on the anterior lip. And endocervical curetting was obtained with a kevorkian curette. The uterus sounded to 9 cm and was to dilated to 23 [place]. Myosure hysteroscope was introduced into the endometrial cavity. Large endometrial polyp was seen arising from near the R tubal opening. The L tubal opening was also visualized. Initially the myosure could not be operated d/t incorrect deficit setting on the machine. The scope was removed and stone forceps and sharp curette were used to remove the polyp almost in its entirety. Other curettings obtained from the cavity as well. Scope was reintroduced. There was still a base of the polyp left. Staff in the meantime got the [facility] operational. The remainder of the polyp was removed with the myosure, with only about 30sec of activation time. At this point the cavity appeared totally normal.     Following the case, the hysteroscope was removed. The single-tooth tenaculum was removed from the cervix. Hemostasis was assured. The patient went to the Recovery Room in satisfactory condition.

## 2023-02-24 NOTE — INTERVAL H&P NOTE
Update History & Physical    The patient's History and Physical of February 17, 2023 was reviewed with the patient and I examined the patient. There was no change. The surgical site was confirmed by the patient and me. Plan: The risks, benefits, expected outcome, and alternative to the recommended procedure have been discussed with the patient. Patient understands and wants to proceed with the procedure.      Electronically signed by Lela Shaw MD on 2/24/2023 at 10:07 AM

## 2023-03-18 NOTE — PROGRESS NOTES
3/22/2023    Damaris Carmen  1975               3+ weeks s/p hysteroscopy, myosure for heavy menses, endo polyp. Path benign on emc and ecc. Patient has no complaints. GI function normal. Appetite normal. Off pain meds. She can already tell she no longer has intermenstrual bldg. She has a polyp. /78   Wt 176 lb (79.8 kg)   LMP 02/21/2023 (Approximate)   BMI 28.41 kg/m²   Body mass index is 28.41 kg/m². Pt in no distress. Alert and oriented x 3. Affect bright. Well developed, well nourished. HEENT: normocephalic, nontraumatic. Sclerae nonicteric. Neuro: grossly NL       Doing well postop. Rtn for next AE or prn  Discussed with patient resume usual activities. Sesar Rodriguez was seen today for post-op check.     Diagnoses and all orders for this visit:    Gwenda Spurling, MD

## 2023-03-22 ENCOUNTER — OFFICE VISIT (OUTPATIENT)
Dept: OBGYN CLINIC | Age: 48
End: 2023-03-22

## 2023-03-22 VITALS — DIASTOLIC BLOOD PRESSURE: 78 MMHG | BODY MASS INDEX: 28.41 KG/M2 | SYSTOLIC BLOOD PRESSURE: 120 MMHG | WEIGHT: 176 LBS

## 2023-03-22 DIAGNOSIS — Z09 POSTOP CHECK: Primary | ICD-10-CM

## 2023-03-22 PROCEDURE — 99024 POSTOP FOLLOW-UP VISIT: CPT | Performed by: OBSTETRICS & GYNECOLOGY

## 2023-03-22 SDOH — ECONOMIC STABILITY: HOUSING INSECURITY
IN THE LAST 12 MONTHS, WAS THERE A TIME WHEN YOU DID NOT HAVE A STEADY PLACE TO SLEEP OR SLEPT IN A SHELTER (INCLUDING NOW)?: NO

## 2023-03-22 SDOH — ECONOMIC STABILITY: INCOME INSECURITY: HOW HARD IS IT FOR YOU TO PAY FOR THE VERY BASICS LIKE FOOD, HOUSING, MEDICAL CARE, AND HEATING?: NOT HARD AT ALL

## 2023-03-22 SDOH — ECONOMIC STABILITY: FOOD INSECURITY: WITHIN THE PAST 12 MONTHS, THE FOOD YOU BOUGHT JUST DIDN'T LAST AND YOU DIDN'T HAVE MONEY TO GET MORE.: NEVER TRUE

## 2023-03-22 SDOH — ECONOMIC STABILITY: FOOD INSECURITY: WITHIN THE PAST 12 MONTHS, YOU WORRIED THAT YOUR FOOD WOULD RUN OUT BEFORE YOU GOT MONEY TO BUY MORE.: NEVER TRUE

## 2023-03-22 ASSESSMENT — PATIENT HEALTH QUESTIONNAIRE - PHQ9
1. LITTLE INTEREST OR PLEASURE IN DOING THINGS: 0
SUM OF ALL RESPONSES TO PHQ QUESTIONS 1-9: 0
SUM OF ALL RESPONSES TO PHQ9 QUESTIONS 1 & 2: 0
SUM OF ALL RESPONSES TO PHQ QUESTIONS 1-9: 0
2. FEELING DOWN, DEPRESSED OR HOPELESS: 0

## 2024-08-06 ENCOUNTER — HOSPITAL ENCOUNTER (OUTPATIENT)
Dept: MAMMOGRAPHY | Age: 49
Discharge: HOME | End: 2024-08-09

## 2024-08-06 VITALS — HEIGHT: 66 IN | BODY MASS INDEX: 28.28 KG/M2 | WEIGHT: 176 LBS

## 2024-08-06 DIAGNOSIS — Z12.31 SCREENING MAMMOGRAM FOR BREAST CANCER: ICD-10-CM

## 2024-08-06 PROCEDURE — 77063 BREAST TOMOSYNTHESIS BI: CPT

## 2024-08-14 SDOH — ECONOMIC STABILITY: INCOME INSECURITY: HOW HARD IS IT FOR YOU TO PAY FOR THE VERY BASICS LIKE FOOD, HOUSING, MEDICAL CARE, AND HEATING?: NOT VERY HARD

## 2024-08-14 SDOH — ECONOMIC STABILITY: FOOD INSECURITY: WITHIN THE PAST 12 MONTHS, THE FOOD YOU BOUGHT JUST DIDN'T LAST AND YOU DIDN'T HAVE MONEY TO GET MORE.: NEVER TRUE

## 2024-08-14 SDOH — ECONOMIC STABILITY: TRANSPORTATION INSECURITY
IN THE PAST 12 MONTHS, HAS LACK OF TRANSPORTATION KEPT YOU FROM MEETINGS, WORK, OR FROM GETTING THINGS NEEDED FOR DAILY LIVING?: NO

## 2024-08-14 SDOH — ECONOMIC STABILITY: FOOD INSECURITY: WITHIN THE PAST 12 MONTHS, YOU WORRIED THAT YOUR FOOD WOULD RUN OUT BEFORE YOU GOT MONEY TO BUY MORE.: NEVER TRUE

## 2024-08-14 NOTE — PROGRESS NOTES
Breasts: no distinct masses or axillary lymphadenopathy. No skin or nipple changes.   Abdomen: soft and nontender, no masses, no organomegaly, no ventral hernia  Pelvic: normal external genitalia, urethral meatus, vagina and cervix. Mucosa slightly pale. Bimanual exam w/o obvious masses or tenderness. Bladder nontender. Uterus upper limits normal size. Adnexae normal.  Perineum and anus normal. No hemorrhoids.       Leonarda \"Carol\" was seen today for annual exam.    Diagnoses and all orders for this visit:    Well woman exam    Encounter for screening mammogram for malignant neoplasm of breast  -     [facility] DAMIEN DIGITAL SCREEN BILATERAL; Future    Screen for colon cancer  -     [facility]    Hx gestational diabetes  -     Glucose, Fasting; Future  -     Hemoglobin A1C; Future    Family history of colon cancer    Screening for diabetes mellitus  -     Glucose, Fasting; Future  -     Hemoglobin A1C; Future    Screening for hyperlipidemia  -     Lipid Panel; Future    Menorrhagia with irregular cycle  -     TSH with Reflex; Future  -     CBC; Future  -     tranexamic acid (LYSTEDA) 650 MG TABS tablet; Take 2 tablets by mouth every 8 hours as needed (heavy menses)         Pt counseling:   Plan to send pap next yr  Reminded pt she needs DM screen yrly d/t hx GDM  Rec pt re-establish with her pcp. So she has someone to reach out to   No hx dvt/pe. Try lysteda for the heavy periods    Explained that she is in menopause transition. Her ovarian fxn, symptoms, periods will wax and wane  D/w pt:  Coconut oil or olive oil as lubricant with intercourse.  [employer] products- they have nonhormonal supplements for hot flashes, vaginal dryness, low libido   Estroven or promensil can be purchased OTC- herbal/soy supplements for mild- moderate hot flashes/night sweats.   Rec the book Menopause manifesto by Andriy  Gave SWETHAS handout on HRT, its risks and benefits, alternatives    Chaperone

## 2024-08-15 ENCOUNTER — OFFICE VISIT (OUTPATIENT)
Dept: OBGYN CLINIC | Age: 49
End: 2024-08-15

## 2024-08-15 VITALS
WEIGHT: 182 LBS | HEIGHT: 66 IN | DIASTOLIC BLOOD PRESSURE: 62 MMHG | SYSTOLIC BLOOD PRESSURE: 104 MMHG | BODY MASS INDEX: 29.25 KG/M2

## 2024-08-15 DIAGNOSIS — Z13.1 SCREENING FOR DIABETES MELLITUS: ICD-10-CM

## 2024-08-15 DIAGNOSIS — N92.1 MENORRHAGIA WITH IRREGULAR CYCLE: ICD-10-CM

## 2024-08-15 DIAGNOSIS — Z86.32 HX GESTATIONAL DIABETES: ICD-10-CM

## 2024-08-15 DIAGNOSIS — Z13.220 SCREENING FOR HYPERLIPIDEMIA: ICD-10-CM

## 2024-08-15 DIAGNOSIS — Z80.0 FAMILY HISTORY OF COLON CANCER: ICD-10-CM

## 2024-08-15 DIAGNOSIS — Z01.419 WELL WOMAN EXAM: Primary | ICD-10-CM

## 2024-08-15 DIAGNOSIS — Z12.11 SCREEN FOR COLON CANCER: ICD-10-CM

## 2024-08-15 DIAGNOSIS — Z12.31 ENCOUNTER FOR SCREENING MAMMOGRAM FOR MALIGNANT NEOPLASM OF BREAST: ICD-10-CM

## 2024-08-15 PROCEDURE — 99396 PREV VISIT EST AGE 40-64: CPT | Performed by: OBSTETRICS & GYNECOLOGY

## 2024-08-15 PROCEDURE — 99459 PELVIC EXAMINATION: CPT | Performed by: OBSTETRICS & GYNECOLOGY

## 2024-08-15 PROCEDURE — 99214 OFFICE O/P EST MOD 30 MIN: CPT | Performed by: OBSTETRICS & GYNECOLOGY

## 2024-08-15 RX ORDER — TRANEXAMIC ACID 650 MG/1
1300 TABLET ORAL EVERY 8 HOURS PRN
Qty: 30 TABLET | Refills: 12 | Status: SHIPPED | OUTPATIENT
Start: 2024-08-15

## 2024-08-15 ASSESSMENT — PATIENT HEALTH QUESTIONNAIRE - PHQ9
1. LITTLE INTEREST OR PLEASURE IN DOING THINGS: NOT AT ALL
SUM OF ALL RESPONSES TO PHQ QUESTIONS 1-9: 0
SUM OF ALL RESPONSES TO PHQ9 QUESTIONS 1 & 2: 0
2. FEELING DOWN, DEPRESSED OR HOPELESS: NOT AT ALL

## 2024-08-23 ENCOUNTER — LAB (OUTPATIENT)
Dept: OBGYN CLINIC | Age: 49
End: 2024-08-23

## 2024-08-23 DIAGNOSIS — Z13.1 SCREENING FOR DIABETES MELLITUS: ICD-10-CM

## 2024-08-23 DIAGNOSIS — Z13.220 SCREENING FOR HYPERLIPIDEMIA: ICD-10-CM

## 2024-08-23 DIAGNOSIS — Z86.32 HX GESTATIONAL DIABETES: ICD-10-CM

## 2024-08-23 DIAGNOSIS — N92.1 MENORRHAGIA WITH IRREGULAR CYCLE: ICD-10-CM

## 2024-08-23 LAB
CHOLEST SERPL-MCNC: 194 MG/DL (ref 0–200)
ERYTHROCYTE [DISTWIDTH] IN BLOOD BY AUTOMATED COUNT: 21.5 % (ref 11.9–14.6)
EST. AVERAGE GLUCOSE BLD GHB EST-MCNC: 104 MG/DL
GLUCOSE P FAST SERPL-MCNC: 85 MG/DL (ref 70–99)
HBA1C MFR BLD: 5.2 % (ref 0–5.6)
HCT VFR BLD AUTO: 39.4 % (ref 35.8–46.3)
HDLC SERPL-MCNC: 55 MG/DL (ref 40–60)
HDLC SERPL: 3.5 (ref 0–5)
HGB BLD-MCNC: 11.5 G/DL (ref 11.7–15.4)
LDLC SERPL CALC-MCNC: 124 MG/DL (ref 0–100)
MCH RBC QN AUTO: 22.7 PG (ref 26.1–32.9)
MCHC RBC AUTO-ENTMCNC: 29.2 G/DL (ref 31.4–35)
MCV RBC AUTO: 77.7 FL (ref 82–102)
NRBC # BLD: 0 K/UL (ref 0–0.2)
PLATELET # BLD AUTO: 272 K/UL (ref 150–450)
PMV BLD AUTO: 10.1 FL (ref 9.4–12.3)
RBC # BLD AUTO: 5.07 M/UL (ref 4.05–5.2)
TRIGL SERPL-MCNC: 79 MG/DL (ref 0–150)
TSH W FREE THYROID IF ABNORMAL: 3.04 UIU/ML (ref 0.27–4.2)
VLDLC SERPL CALC-MCNC: 16 MG/DL (ref 6–23)
WBC # BLD AUTO: 5.1 K/UL (ref 4.3–11.1)

## (undated) DEVICE — CARDINAL HEALTH FLEXIBLE LIGHT HANDLE COVER: Brand: CARDINAL HEALTH

## (undated) DEVICE — DRAPE,UNDERBUTTOCKS,PCH,STERILE: Brand: MEDLINE

## (undated) DEVICE — SOLUTION IRRIG 3000ML 0.9% SOD CHL USP UROMATIC PLAS CONT

## (undated) DEVICE — GLOVE SURG SZ 6 THK91MIL LTX FREE SYN POLYISOPRENE ANTI

## (undated) DEVICE — MINOR LITHOTOMY PACK: Brand: MEDLINE INDUSTRIES, INC.

## (undated) DEVICE — DEVICE TISS REM IU CANSTR VAC TB FT PEDAL DISPOSABLE MYOSURE